# Patient Record
Sex: MALE | Race: WHITE | NOT HISPANIC OR LATINO
[De-identification: names, ages, dates, MRNs, and addresses within clinical notes are randomized per-mention and may not be internally consistent; named-entity substitution may affect disease eponyms.]

---

## 2018-11-14 PROBLEM — Z00.00 ENCOUNTER FOR PREVENTIVE HEALTH EXAMINATION: Status: ACTIVE | Noted: 2018-11-14

## 2019-03-11 ENCOUNTER — RECORD ABSTRACTING (OUTPATIENT)
Age: 59
End: 2019-03-11

## 2019-03-11 DIAGNOSIS — Z80.1 FAMILY HISTORY OF MALIGNANT NEOPLASM OF TRACHEA, BRONCHUS AND LUNG: ICD-10-CM

## 2019-03-11 DIAGNOSIS — Z82.49 FAMILY HISTORY OF ISCHEMIC HEART DISEASE AND OTHER DISEASES OF THE CIRCULATORY SYSTEM: ICD-10-CM

## 2019-03-11 DIAGNOSIS — Z83.3 FAMILY HISTORY OF DIABETES MELLITUS: ICD-10-CM

## 2019-03-11 DIAGNOSIS — Z78.9 OTHER SPECIFIED HEALTH STATUS: ICD-10-CM

## 2019-05-02 ENCOUNTER — APPOINTMENT (OUTPATIENT)
Dept: CARDIOLOGY | Facility: CLINIC | Age: 59
End: 2019-05-02
Payer: COMMERCIAL

## 2019-05-02 VITALS
HEART RATE: 96 BPM | DIASTOLIC BLOOD PRESSURE: 78 MMHG | WEIGHT: 217 LBS | SYSTOLIC BLOOD PRESSURE: 130 MMHG | HEIGHT: 68 IN | BODY MASS INDEX: 32.89 KG/M2

## 2019-05-02 PROCEDURE — 99212 OFFICE O/P EST SF 10 MIN: CPT | Mod: 25

## 2019-05-02 PROCEDURE — 93015 CV STRESS TEST SUPVJ I&R: CPT

## 2019-05-02 RX ORDER — METOPROLOL TARTRATE 25 MG/1
25 TABLET, FILM COATED ORAL
Refills: 0 | Status: DISCONTINUED | COMMUNITY
End: 2019-05-02

## 2019-05-02 NOTE — DISCUSSION/SUMMARY
[FreeTextEntry1] : Today's treadmill stress test reveals no evidence of exercise-induced myocardial ischemia. I am encouraging the patient to take up again a program of regular aerobic exercise and weight reduction. I have reviewed his medications which appear to be appropriate in these will be continued. His labs including lipid levels have been checked by his primary care physician. Based on today's examination I find no gross cardiac status to be stable. We will continue to follow him on an annual basis.

## 2019-05-02 NOTE — PHYSICAL EXAM
[Well Groomed] : well groomed [Normal Appearance] : normal appearance [General Appearance - Well Developed] : well developed [General Appearance - Well Nourished] : well nourished [No Deformities] : no deformities [Normal Conjunctiva] : the conjunctiva exhibited no abnormalities [General Appearance - In No Acute Distress] : no acute distress [Eyelids - No Xanthelasma] : the eyelids demonstrated no xanthelasmas [No Oral Pallor] : no oral pallor [Normal Oral Mucosa] : normal oral mucosa [Normal Jugular Venous A Waves Present] : normal jugular venous A waves present [No Oral Cyanosis] : no oral cyanosis [Normal Jugular Venous V Waves Present] : normal jugular venous V waves present [No Jugular Venous Gonzales A Waves] : no jugular venous gonzales A waves [Exaggerated Use Of Accessory Muscles For Inspiration] : no accessory muscle use [Respiration, Rhythm And Depth] : normal respiratory rhythm and effort [Auscultation Breath Sounds / Voice Sounds] : lungs were clear to auscultation bilaterally [Heart Rate And Rhythm] : heart rate and rhythm were normal [Murmurs] : no murmurs present [Heart Sounds] : normal S1 and S2 [Abdomen Soft] : soft [Abdomen Tenderness] : non-tender [Abdomen Mass (___ Cm)] : no abdominal mass palpated [Abnormal Walk] : normal gait [Nail Clubbing] : no clubbing of the fingernails [Gait - Sufficient For Exercise Testing] : the gait was sufficient for exercise testing [Cyanosis, Localized] : no localized cyanosis [Petechial Hemorrhages (___cm)] : no petechial hemorrhages [Skin Color & Pigmentation] : normal skin color and pigmentation [] : no rash [Skin Lesions] : no skin lesions [No Venous Stasis] : no venous stasis [No Skin Ulcers] : no skin ulcer [No Xanthoma] : no  xanthoma was observed

## 2019-05-02 NOTE — REASON FOR VISIT
[FreeTextEntry1] : Patient comes for followup today and stress testing. He is followed in a preventive mode with coronary risk factors of hypertension and hyperlipidemia these are controlled with medications. The patient has had no recent cardiac symptoms. Exercise has been limited during these past several months as he has been caring for his wife who was injured in a full

## 2019-06-20 ENCOUNTER — APPOINTMENT (OUTPATIENT)
Dept: CARDIOLOGY | Facility: CLINIC | Age: 59
End: 2019-06-20
Payer: COMMERCIAL

## 2019-06-20 VITALS
HEIGHT: 68 IN | WEIGHT: 213 LBS | DIASTOLIC BLOOD PRESSURE: 80 MMHG | BODY MASS INDEX: 32.28 KG/M2 | SYSTOLIC BLOOD PRESSURE: 130 MMHG | HEART RATE: 77 BPM

## 2019-06-20 PROCEDURE — 93000 ELECTROCARDIOGRAM COMPLETE: CPT

## 2019-06-20 PROCEDURE — 99214 OFFICE O/P EST MOD 30 MIN: CPT

## 2019-06-20 RX ORDER — PENICILLIN V POTASSIUM 500 MG/1
500 TABLET, FILM COATED ORAL
Qty: 28 | Refills: 0 | Status: DISCONTINUED | COMMUNITY
Start: 2019-02-13 | End: 2019-06-20

## 2019-06-20 RX ORDER — OXYCODONE AND ACETAMINOPHEN 5; 325 MG/1; MG/1
5-325 TABLET ORAL
Qty: 10 | Refills: 0 | Status: DISCONTINUED | COMMUNITY
Start: 2019-02-28 | End: 2019-06-20

## 2019-06-20 NOTE — REASON FOR VISIT
[FreeTextEntry1] : Patient had a peculiar incident yesterday while at work. He developed visual blurring followed by a severe headache in the frontal region bilaterally. Patient appeared to be flushed and an ambulance was called which took him to [facility] ER where he underwent an evaluation including a CT scan of the brain. This was negative. The patient's blood pressure was elevated on arrival in the ER. Patient was sent home and advised to followup with his physicians. Since that time and has been no recurrence of symptoms he is doing much better today but remains concerned about what happened yesterday.

## 2019-06-20 NOTE — DISCUSSION/SUMMARY
[FreeTextEntry1] : The patient's symptoms from yesterday. I don't find any compelling evidence that this was indeed a TIA and there were no focal or lateralizing neurologic signs. Because of the severe frontal headache that was present throughout the illness I believe migraine headache as a possibility. I don't believe this was of a cardiac nature. There was no evidence to suggest a cardiac arrhythmia. Today's blood pressure is 130/80 the electrocardiogram was normal. I believe the patient's clinical condition at this time is stable. The correct referral would be to neurology. Possibly an additional examination including MRI might be indicated. I'm referring the patient to a neurologist for further evaluation.

## 2019-06-20 NOTE — HISTORY OF PRESENT ILLNESS
[FreeTextEntry1] : During the present illness there was no nausea or vomiting fever or chills.Some reason a spinal tap was recommended which the patient declined. Patient's wife saw him in the ER she is a registered nurse and found him to be disoriented. The patient was improved on discharge but not completely better. He continues to neck problems today and is virtually back to normal. Her headache was present sustained during the day and throughout the night and finally resolving with an Excedrin earlier this morning. It should be noted that the patient did have severe migraine headaches many years ago and was once hospitalized for this and even underwent an angiogram. He was at that time the diagnosis of migraines was made. He has never been on medical therapy for migraine headaches. He has not been under any unusual stress at home or at work.

## 2019-06-20 NOTE — PHYSICAL EXAM
[Normal Appearance] : normal appearance [General Appearance - Well Developed] : well developed [Well Groomed] : well groomed [General Appearance - Well Nourished] : well nourished [No Deformities] : no deformities [Eyelids - No Xanthelasma] : the eyelids demonstrated no xanthelasmas [General Appearance - In No Acute Distress] : no acute distress [Normal Conjunctiva] : the conjunctiva exhibited no abnormalities [Normal Oral Mucosa] : normal oral mucosa [No Oral Pallor] : no oral pallor [Normal Jugular Venous A Waves Present] : normal jugular venous A waves present [No Oral Cyanosis] : no oral cyanosis [Normal Jugular Venous V Waves Present] : normal jugular venous V waves present [No Jugular Venous Gonzales A Waves] : no jugular venous gonzales A waves [Respiration, Rhythm And Depth] : normal respiratory rhythm and effort [Auscultation Breath Sounds / Voice Sounds] : lungs were clear to auscultation bilaterally [Exaggerated Use Of Accessory Muscles For Inspiration] : no accessory muscle use [Heart Sounds] : normal S1 and S2 [Heart Rate And Rhythm] : heart rate and rhythm were normal [Murmurs] : no murmurs present [Abdomen Soft] : soft [Abdomen Tenderness] : non-tender [Abdomen Mass (___ Cm)] : no abdominal mass palpated [Abnormal Walk] : normal gait [Gait - Sufficient For Exercise Testing] : the gait was sufficient for exercise testing [Cyanosis, Localized] : no localized cyanosis [Nail Clubbing] : no clubbing of the fingernails [Petechial Hemorrhages (___cm)] : no petechial hemorrhages [Skin Color & Pigmentation] : normal skin color and pigmentation [] : no ischemic changes [Skin Lesions] : no skin lesions [No Venous Stasis] : no venous stasis [No Xanthoma] : no  xanthoma was observed [Oriented To Time, Place, And Person] : oriented to person, place, and time [No Skin Ulcers] : no skin ulcer [Affect] : the affect was normal [Mood] : the mood was normal [No Anxiety] : not feeling anxious

## 2019-07-19 ENCOUNTER — APPOINTMENT (OUTPATIENT)
Dept: CARDIOLOGY | Facility: CLINIC | Age: 59
End: 2019-07-19

## 2019-07-25 ENCOUNTER — APPOINTMENT (OUTPATIENT)
Dept: CARDIOLOGY | Facility: CLINIC | Age: 59
End: 2019-07-25
Payer: COMMERCIAL

## 2019-07-25 VITALS
DIASTOLIC BLOOD PRESSURE: 76 MMHG | HEIGHT: 68 IN | WEIGHT: 210 LBS | BODY MASS INDEX: 31.83 KG/M2 | SYSTOLIC BLOOD PRESSURE: 124 MMHG | HEART RATE: 83 BPM

## 2019-07-25 PROCEDURE — 0296T: CPT

## 2019-07-25 PROCEDURE — 93306 TTE W/DOPPLER COMPLETE: CPT

## 2019-07-25 RX ORDER — CHLORHEXIDINE GLUCONATE, 0.12% ORAL RINSE 1.2 MG/ML
0.12 SOLUTION DENTAL
Qty: 473 | Refills: 0 | Status: DISCONTINUED | COMMUNITY
Start: 2019-02-13 | End: 2019-07-25

## 2019-07-25 NOTE — REASON FOR VISIT
[FreeTextEntry1] : The patient is undergoing an evaluation for a recent neurologic event. Please refer to my earlier report for a full description. The patient's clinical condition has completely normalized. There have been no recurrent symptoms he is back at work and is exercising regularly and has lost 7 pounds. He comes to the office today for echocardiography and placement of a event monitor.

## 2019-08-15 PROCEDURE — 0298T: CPT

## 2020-05-04 ENCOUNTER — NON-APPOINTMENT (OUTPATIENT)
Age: 60
End: 2020-05-04

## 2020-05-04 ENCOUNTER — APPOINTMENT (OUTPATIENT)
Dept: CARDIOLOGY | Facility: CLINIC | Age: 60
End: 2020-05-04
Payer: COMMERCIAL

## 2020-05-04 PROCEDURE — 99214 OFFICE O/P EST MOD 30 MIN: CPT

## 2020-05-04 PROCEDURE — 93000 ELECTROCARDIOGRAM COMPLETE: CPT

## 2020-05-04 NOTE — DISCUSSION/SUMMARY
[FreeTextEntry1] : tHE PATIENT HAS HAD NO significant cardiac symptoms. I am concerned about weight gain. I have encouraged him to resume his program of regular walking exercise and a low-carb  diet. Weight reduction would be very desirable in this case. Also this may have salutary effects with regard to sleep apnea. The patient had a regular physical examination earlier today with his primary-care blood testing was drawn and I am requesting a copy for my review. Also I'd like to do treadmill stress testing at the next visit within several months.

## 2020-05-04 NOTE — PHYSICAL EXAM
[Normal Conjunctiva] : the conjunctiva exhibited no abnormalities [Normal Oral Mucosa] : normal oral mucosa [Eyelids - No Xanthelasma] : the eyelids demonstrated no xanthelasmas [No Oral Cyanosis] : no oral cyanosis [No Oral Pallor] : no oral pallor [Normal Jugular Venous A Waves Present] : normal jugular venous A waves present [Normal Jugular Venous V Waves Present] : normal jugular venous V waves present [No Jugular Venous Gonzales A Waves] : no jugular venous gonzales A waves [Respiration, Rhythm And Depth] : normal respiratory rhythm and effort [Exaggerated Use Of Accessory Muscles For Inspiration] : no accessory muscle use [Auscultation Breath Sounds / Voice Sounds] : lungs were clear to auscultation bilaterally [Heart Sounds] : normal S1 and S2 [Heart Rate And Rhythm] : heart rate and rhythm were normal [Murmurs] : no murmurs present [Abdomen Soft] : soft [Abdomen Tenderness] : non-tender [Abdomen Mass (___ Cm)] : no abdominal mass palpated [Abnormal Walk] : normal gait [Gait - Sufficient For Exercise Testing] : the gait was sufficient for exercise testing [Nail Clubbing] : no clubbing of the fingernails [Cyanosis, Localized] : no localized cyanosis [Petechial Hemorrhages (___cm)] : no petechial hemorrhages [Skin Color & Pigmentation] : normal skin color and pigmentation [] : no rash [No Venous Stasis] : no venous stasis [Skin Lesions] : no skin lesions [No Skin Ulcers] : no skin ulcer [No Xanthoma] : no  xanthoma was observed [Oriented To Time, Place, And Person] : oriented to person, place, and time [Affect] : the affect was normal [Mood] : the mood was normal [No Anxiety] : not feeling anxious [FreeTextEntry1] : OVERWEIGHT/CENTRAL OBESITY.

## 2020-05-04 NOTE — REASON FOR VISIT
[FreeTextEntry1] : The patient in general has done well. However after shoulder surgery several months ago his activities were limited and there has been weight gain. There have been no acute symptoms of unusual shortness of breath palpitations dizziness or lightheadedness. He does note an occasional right pectoral discomfort which is not related to physical exertion.

## 2020-07-31 ENCOUNTER — APPOINTMENT (OUTPATIENT)
Dept: CARDIOLOGY | Facility: CLINIC | Age: 60
End: 2020-07-31

## 2020-09-01 ENCOUNTER — APPOINTMENT (OUTPATIENT)
Dept: CARDIOLOGY | Facility: CLINIC | Age: 60
End: 2020-09-01
Payer: COMMERCIAL

## 2020-09-01 VITALS
WEIGHT: 213 LBS | HEIGHT: 68 IN | SYSTOLIC BLOOD PRESSURE: 110 MMHG | DIASTOLIC BLOOD PRESSURE: 72 MMHG | HEART RATE: 54 BPM | BODY MASS INDEX: 32.28 KG/M2

## 2020-09-01 DIAGNOSIS — R07.9 CHEST PAIN, UNSPECIFIED: ICD-10-CM

## 2020-09-01 PROCEDURE — 93015 CV STRESS TEST SUPVJ I&R: CPT

## 2020-09-01 PROCEDURE — 99213 OFFICE O/P EST LOW 20 MIN: CPT | Mod: 25

## 2020-09-01 NOTE — REASON FOR VISIT
[FreeTextEntry1] : The patient returns today for followup including stress testing. He is followed at night mainly preventive mode with hypertension and hyperlipidemia.

## 2020-09-01 NOTE — PHYSICAL EXAM
[General Appearance - Well Developed] : well developed [Normal Appearance] : normal appearance [Well Groomed] : well groomed [General Appearance - Well Nourished] : well nourished [No Deformities] : no deformities [General Appearance - In No Acute Distress] : no acute distress [FreeTextEntry1] : overweight [Normal Conjunctiva] : the conjunctiva exhibited no abnormalities [Eyelids - No Xanthelasma] : the eyelids demonstrated no xanthelasmas [Normal Oral Mucosa] : normal oral mucosa [No Oral Pallor] : no oral pallor [No Oral Cyanosis] : no oral cyanosis [Normal Jugular Venous A Waves Present] : normal jugular venous A waves present [Normal Jugular Venous V Waves Present] : normal jugular venous V waves present [No Jugular Venous Gonzales A Waves] : no jugular venous gonzales A waves [Respiration, Rhythm And Depth] : normal respiratory rhythm and effort [Exaggerated Use Of Accessory Muscles For Inspiration] : no accessory muscle use [Auscultation Breath Sounds / Voice Sounds] : lungs were clear to auscultation bilaterally [Heart Rate And Rhythm] : heart rate and rhythm were normal [Heart Sounds] : normal S1 and S2 [Murmurs] : no murmurs present [Abdomen Soft] : soft [Abdomen Tenderness] : non-tender [Abdomen Mass (___ Cm)] : no abdominal mass palpated [Abnormal Walk] : normal gait [Gait - Sufficient For Exercise Testing] : the gait was sufficient for exercise testing [Nail Clubbing] : no clubbing of the fingernails [Cyanosis, Localized] : no localized cyanosis [Petechial Hemorrhages (___cm)] : no petechial hemorrhages [Skin Color & Pigmentation] : normal skin color and pigmentation [] : no rash [No Venous Stasis] : no venous stasis [Skin Lesions] : no skin lesions [No Skin Ulcers] : no skin ulcer [No Xanthoma] : no  xanthoma was observed [Oriented To Time, Place, And Person] : oriented to person, place, and time [Affect] : the affect was normal [Mood] : the mood was normal [No Anxiety] : not feeling anxious

## 2020-09-01 NOTE — DISCUSSION/SUMMARY
[FreeTextEntry1] : The patient returns today for followup and stress testing. There have been no cardiac or neurologic symptoms. Last year he underwent a very thorough neurologic evaluation for symptoms of blurred vision numbness et cetera. The final diagnosis was a migraine variant. Also the patient has probable sleep apnea but could not adjust to a CPAP machine. I suggested to the patient that he might try a different lead configured CPAP mask. Today's stress test reveals no evidence of exercise-induced myocardial ischemia or arrhythmias. I find his cardiac status to be stable. The current medications will be continued. There has been some weight gain during the current  epidemic. Encouraging the patient to try to maintain a program of exercise and weight reduction.

## 2020-09-02 RX ORDER — HYDROCORTISONE 25 MG/G
2.5 CREAM TOPICAL
Qty: 28 | Refills: 0 | Status: DISCONTINUED | COMMUNITY
Start: 2020-07-22 | End: 2020-09-02

## 2020-09-02 RX ORDER — ESCITALOPRAM OXALATE 10 MG/1
10 TABLET, FILM COATED ORAL
Refills: 0 | Status: DISCONTINUED | COMMUNITY
End: 2020-09-02

## 2020-09-02 RX ORDER — MULTIVIT-MIN/FOLIC/VIT K/LYCOP 400-300MCG
25 MCG TABLET ORAL
Refills: 0 | Status: DISCONTINUED | COMMUNITY
End: 2020-09-02

## 2020-09-02 RX ORDER — KETOCONAZOLE 20 MG/G
2 CREAM TOPICAL
Qty: 60 | Refills: 0 | Status: DISCONTINUED | COMMUNITY
Start: 2020-07-22 | End: 2020-09-02

## 2021-05-06 ENCOUNTER — NON-APPOINTMENT (OUTPATIENT)
Age: 61
End: 2021-05-06

## 2021-05-06 ENCOUNTER — APPOINTMENT (OUTPATIENT)
Dept: CARDIOLOGY | Facility: CLINIC | Age: 61
End: 2021-05-06
Payer: COMMERCIAL

## 2021-05-06 PROCEDURE — 93000 ELECTROCARDIOGRAM COMPLETE: CPT

## 2021-05-06 PROCEDURE — 99072 ADDL SUPL MATRL&STAF TM PHE: CPT

## 2021-05-06 PROCEDURE — 99213 OFFICE O/P EST LOW 20 MIN: CPT

## 2021-05-06 RX ORDER — DULOXETINE HYDROCHLORIDE 20 MG/1
20 CAPSULE, DELAYED RELEASE PELLETS ORAL TWICE DAILY
Refills: 0 | Status: ACTIVE | COMMUNITY

## 2021-05-06 RX ORDER — METOPROLOL SUCCINATE 25 MG/1
25 TABLET, EXTENDED RELEASE ORAL
Qty: 90 | Refills: 3 | Status: ACTIVE | COMMUNITY

## 2021-05-06 RX ORDER — MONTELUKAST 10 MG/1
10 TABLET, FILM COATED ORAL DAILY
Refills: 0 | Status: ACTIVE | COMMUNITY

## 2021-05-06 RX ORDER — OMEGA-3/DHA/EPA/FISH OIL 300-1000MG
1000 CAPSULE ORAL
Refills: 0 | Status: ACTIVE | COMMUNITY

## 2021-05-06 RX ORDER — ROSUVASTATIN CALCIUM 10 MG/1
10 TABLET, FILM COATED ORAL
Qty: 90 | Refills: 3 | Status: ACTIVE | COMMUNITY

## 2021-05-06 RX ORDER — ASPIRIN 81 MG/1
81 TABLET, COATED ORAL DAILY
Refills: 0 | Status: ACTIVE | COMMUNITY

## 2021-05-06 NOTE — DISCUSSION/SUMMARY
[FreeTextEntry1] : I'm very happy with today's visit. The patient has made great clinical progress. His blood pressure is now under very good control and he has lost a considerable amount of weight. I plan to continue the current medications along with a continuing program of diet and exercise. The patient had a repeat lab test today and I am requesting the results.

## 2021-05-06 NOTE — REASON FOR VISIT
[FreeTextEntry1] : The patient returns for followup today he is followed mainly in her preventive mode with the principal diagnosis of hypertension and hyperlipidemia. The patient has made great progress since his last visit he has lost about 10 pounds by following a better diet restricted in carbohydrates. There have no acute cardiac symptoms.

## 2022-08-11 ENCOUNTER — NON-APPOINTMENT (OUTPATIENT)
Age: 62
End: 2022-08-11

## 2022-08-11 ENCOUNTER — APPOINTMENT (OUTPATIENT)
Dept: CARDIOLOGY | Facility: CLINIC | Age: 62
End: 2022-08-11

## 2022-08-11 VITALS
BODY MASS INDEX: 30.92 KG/M2 | SYSTOLIC BLOOD PRESSURE: 120 MMHG | OXYGEN SATURATION: 96 % | WEIGHT: 204 LBS | HEIGHT: 68 IN | DIASTOLIC BLOOD PRESSURE: 76 MMHG | HEART RATE: 70 BPM

## 2022-08-11 DIAGNOSIS — I10 ESSENTIAL (PRIMARY) HYPERTENSION: ICD-10-CM

## 2022-08-11 PROCEDURE — 93015 CV STRESS TEST SUPVJ I&R: CPT

## 2022-08-11 PROCEDURE — 99213 OFFICE O/P EST LOW 20 MIN: CPT | Mod: 25

## 2022-08-11 RX ORDER — BENZONATATE 200 MG/1
200 CAPSULE ORAL
Qty: 30 | Refills: 0 | Status: ACTIVE | COMMUNITY
Start: 2022-06-15

## 2022-08-11 RX ORDER — HYDROCODONE BITARTRATE AND HOMATROPINE METHYLBROMIDE 5; 1.5 MG/5ML; MG/5ML
5-1.5 SOLUTION ORAL
Qty: 200 | Refills: 0 | Status: ACTIVE | COMMUNITY
Start: 2022-06-24

## 2022-08-11 RX ORDER — MOLNUPIRAVIR 200 MG/1
200 CAPSULE ORAL
Qty: 40 | Refills: 0 | Status: ACTIVE | COMMUNITY
Start: 2022-06-15

## 2022-08-11 RX ORDER — SODIUM FLUORIDE 6 MG/ML
1.1 PASTE DENTAL
Qty: 100 | Refills: 0 | Status: ACTIVE | COMMUNITY
Start: 2022-01-27

## 2022-08-11 RX ORDER — PRIMIDONE 50 MG/1
50 TABLET ORAL
Qty: 180 | Refills: 0 | Status: ACTIVE | COMMUNITY
Start: 2022-07-11

## 2022-08-11 RX ORDER — UBROGEPANT 100 MG/1
100 TABLET ORAL
Refills: 0 | Status: DISCONTINUED | COMMUNITY
End: 2022-08-11

## 2022-08-11 NOTE — REVIEW OF SYSTEMS
[Negative] : Heme/Lymph [de-identified] : The patient has been treated lately for benign essential tremor

## 2022-08-11 NOTE — DISCUSSION/SUMMARY
[FreeTextEntry1] : The patient's clinical condition has improved.  He has lost weight with diet and exercise\par The cardiorespiratory examination is normal\par \par The patient did very well on today's stress test there was no evidence of exercise-induced myocardial ischemia or arrhythmias and the patient has good exercise capacity.\par \par Based on my evaluation and assessment I am very pleased with the patient's progress.  The current medications will be continued we will follow-up in 1 year

## 2022-08-11 NOTE — REASON FOR VISIT
[FreeTextEntry1] : Patient returns today for follow-up including treadmill testing.  We are following him in a preventive mode with a history of hypertension and hyperlipidemia.\par Patient has had no acute cardiac symptoms there have been no symptoms of chest pain shortness of breath or palpitations.  He has lost a great deal of weight by following a healthier diet combined with regular exercise.\par Approximately 2 years ago there was a scare regarding a visual disturbance however after a thorough evaluation this was felt to be due to an ocular migraine.

## 2022-09-07 NOTE — DISCUSSION/SUMMARY
[FreeTextEntry1] : Today's echocardiogram just completed reveals normal LV and RV function. Estimated left ventricular ejection fraction 65%. There were no significant valvular abnormalities noted. The left atrial volume is slightly increased. There is no substrate for a cardio embolic event demonstrated on this study. In order to complete the cardiology evaluation I am placing a two-week cardiac monitor in the hopes of discovering occult arrhythmia. 10

## 2023-05-24 ENCOUNTER — OFFICE (OUTPATIENT)
Dept: URBAN - METROPOLITAN AREA CLINIC 29 | Facility: CLINIC | Age: 63
Setting detail: OPHTHALMOLOGY
End: 2023-05-24
Payer: COMMERCIAL

## 2023-05-24 DIAGNOSIS — H01.002: ICD-10-CM

## 2023-05-24 DIAGNOSIS — S04.52XA: ICD-10-CM

## 2023-05-24 DIAGNOSIS — H40.033: ICD-10-CM

## 2023-05-24 DIAGNOSIS — H01.004: ICD-10-CM

## 2023-05-24 DIAGNOSIS — H01.001: ICD-10-CM

## 2023-05-24 DIAGNOSIS — H01.005: ICD-10-CM

## 2023-05-24 DIAGNOSIS — H25.13: ICD-10-CM

## 2023-05-24 PROBLEM — H10.50 BLEPHAROCONJUNCTIVITIS UNSPECIFIED [ICD-10: H10.50-, ICD-9: 372.20]>>: Status: ACTIVE | Noted: 2023-05-24

## 2023-05-24 PROCEDURE — 99213 OFFICE O/P EST LOW 20 MIN: CPT | Performed by: OPHTHALMOLOGY

## 2023-05-24 ASSESSMENT — SPHEQUIV_DERIVED
OD_SPHEQUIV: 2.5
OD_SPHEQUIV: 3
OS_SPHEQUIV: 2.625
OS_SPHEQUIV: 2.25
OD_SPHEQUIV: 2.5
OS_SPHEQUIV: 2.625

## 2023-05-24 ASSESSMENT — REFRACTION_MANIFEST
OD_SPHERE: +2.00
OD_ADD: +2.50
OD_ADD: +2.00
OS_VA1: 20/25
OS_SPHERE: +2.00
OD_SPHERE: +2.75
OS_SPHERE: +2.25
OD_VA1: 20/40
OD_CYLINDER: +0.50
OD_CYLINDER: +1.00
OS_AXIS: 015
OS_AXIS: 15
OS_CYLINDER: +0.50
OS_VA1: 20/25
OS_ADD: +2.00
OS_CYLINDER: +0.75
OS_ADD: +2.50
OD_AXIS: 165
OD_AXIS: 160
OD_VA1: 20/25

## 2023-05-24 ASSESSMENT — REFRACTION_CURRENTRX
OS_AXIS: 15
OS_ADD: +2.00
OS_CYLINDER: +0.75
OD_OVR_VA: 20/
OD_AXIS: 160
OS_OVR_VA: 20/
OD_ADD: +2.00
OS_SPHERE: +2.00
OD_SPHERE: +2.75
OD_CYLINDER: +0.50

## 2023-05-24 ASSESSMENT — REFRACTION_AUTOREFRACTION
OS_SPHERE: +2.25
OD_CYLINDER: +1.00
OD_SPHERE: +2.00
OS_AXIS: 025
OD_AXIS: 165
OS_CYLINDER: +0.75

## 2023-05-24 ASSESSMENT — VISUAL ACUITY
OD_BCVA: 20/25
OS_BCVA: 20/40+1

## 2023-05-24 ASSESSMENT — LID EXAM ASSESSMENTS
OD_BLEPHARITIS: RLL RUL 1+
OS_BLEPHARITIS: LLL LUL 1+

## 2023-05-24 ASSESSMENT — CONFRONTATIONAL VISUAL FIELD TEST (CVF)
OS_FINDINGS: FULL
OD_FINDINGS: FULL

## 2023-08-15 ENCOUNTER — APPOINTMENT (OUTPATIENT)
Dept: CARDIOLOGY | Facility: CLINIC | Age: 63
End: 2023-08-15
Payer: COMMERCIAL

## 2023-08-15 ENCOUNTER — NON-APPOINTMENT (OUTPATIENT)
Age: 63
End: 2023-08-15

## 2023-08-15 VITALS
SYSTOLIC BLOOD PRESSURE: 118 MMHG | OXYGEN SATURATION: 97 % | HEART RATE: 93 BPM | WEIGHT: 195 LBS | HEIGHT: 68 IN | BODY MASS INDEX: 29.55 KG/M2 | DIASTOLIC BLOOD PRESSURE: 74 MMHG

## 2023-08-15 DIAGNOSIS — Z86.73 PERSONAL HISTORY OF TRANSIENT ISCHEMIC ATTACK (TIA), AND CEREBRAL INFARCTION W/OUT RESIDUAL DEFICITS: ICD-10-CM

## 2023-08-15 DIAGNOSIS — I10 ESSENTIAL (PRIMARY) HYPERTENSION: ICD-10-CM

## 2023-08-15 DIAGNOSIS — G43.109 MIGRAINE WITH AURA, NOT INTRACTABLE, W/OUT STATUS MIGRAINOSUS: ICD-10-CM

## 2023-08-15 DIAGNOSIS — E78.5 HYPERLIPIDEMIA, UNSPECIFIED: ICD-10-CM

## 2023-08-15 PROCEDURE — 99213 OFFICE O/P EST LOW 20 MIN: CPT | Mod: 25

## 2023-08-15 PROCEDURE — 93000 ELECTROCARDIOGRAM COMPLETE: CPT

## 2023-08-15 NOTE — DISCUSSION/SUMMARY
[FreeTextEntry1] : The patient returns for follow-up today  When I initially evaluated him a number of years ago he complained of chest discomfort this proved to be noncardiac in nature and has completely resolved  The patient has made excellent clinical progress especially with regard to weight reduction by following a better diet limited in carbohydrates  Blood pressure is normal cardiorespiratory examination is normal electrocardiogram is normal  Stress testing last year normal  Based on my evaluation and assessment I find the patient's cardiovascular status to be stable the current medical conditions will be continued we will do routine treadmill testing in 1 yeaR.

## 2023-08-15 NOTE — REASON FOR VISIT
[FreeTextEntry1] : The patient returns for follow-up today.  We have been following him mainly in a preventive mode with a primary diagnoses of hypertension and hyperlipidemia.  These have been very well controlled with medications.  There was a possible diagnosis of TIA several years ago which caused a transient visual abnormality.  However this was thoroughly evaluated and shown to be an ocular migraine.  There have been no recurrences of migraines or visual disturbances  The patient has been very successful in losing weight by following a low carbohydrate diet  He describes no recent cardiac symptoms there have been no symptoms of chest pain shortness of breath or palpitations.  The patient describes a mild form of Bell's palsy involving the left side of the face several months ago this has virtually resolved.

## 2023-09-29 ENCOUNTER — RX ONLY (RX ONLY)
Age: 63
End: 2023-09-29

## 2023-09-29 ENCOUNTER — OFFICE (OUTPATIENT)
Dept: URBAN - METROPOLITAN AREA CLINIC 29 | Facility: CLINIC | Age: 63
Setting detail: OPHTHALMOLOGY
End: 2023-09-29
Payer: COMMERCIAL

## 2023-09-29 DIAGNOSIS — H01.001: ICD-10-CM

## 2023-09-29 DIAGNOSIS — S04.52XA: ICD-10-CM

## 2023-09-29 DIAGNOSIS — H01.005: ICD-10-CM

## 2023-09-29 DIAGNOSIS — H35.372: ICD-10-CM

## 2023-09-29 DIAGNOSIS — H01.004: ICD-10-CM

## 2023-09-29 DIAGNOSIS — H25.13: ICD-10-CM

## 2023-09-29 DIAGNOSIS — H43.811: ICD-10-CM

## 2023-09-29 DIAGNOSIS — H01.002: ICD-10-CM

## 2023-09-29 DIAGNOSIS — H40.033: ICD-10-CM

## 2023-09-29 PROCEDURE — 92020 GONIOSCOPY: CPT | Performed by: OPHTHALMOLOGY

## 2023-09-29 PROCEDURE — 99214 OFFICE O/P EST MOD 30 MIN: CPT | Performed by: OPHTHALMOLOGY

## 2023-09-29 ASSESSMENT — REFRACTION_CURRENTRX
OD_AXIS: 167
OS_OVR_VA: 20/
OS_ADD: +2.00
OD_ADD: +2.00
OS_CYLINDER: +1.00
OS_VPRISM_DIRECTION: PROGS
OD_CYLINDER: +1.00
OS_SPHERE: +2.00
OS_AXIS: 12
OD_SPHERE: +3.00
OD_VPRISM_DIRECTION: PROGS
OD_OVR_VA: 20/

## 2023-09-29 ASSESSMENT — REFRACTION_MANIFEST
OD_CYLINDER: +1.00
OD_ADD: +2.50
OS_AXIS: 15
OS_ADD: +2.50
OS_ADD: +2.50
OS_VA1: 20/25
OU_VA: 20/30-
OS_CYLINDER: +0.50
OD_VA1: 20/40-2
OD_AXIS: 160
OD_ADD: +2.50
OS_SPHERE: +1.25
OS_CYLINDER: +0.50
OD_SPHERE: +2.00
OS_VA1: 20/25-2
OS_SPHERE: +2.00
OD_AXIS: 160
OD_CYLINDER: +1.25
OD_VA1: 20/40
OD_SPHERE: +2.00
OS_AXIS: 015

## 2023-09-29 ASSESSMENT — AXIALLENGTH_DERIVED
OS_AL: 29.36
OD_AL: 22.6117
OS_AL: 29.51
OD_AL: 22.7921
OS_AL: 29.82
OD_AL: 22.6565

## 2023-09-29 ASSESSMENT — KERATOMETRY
OD_K1POWER_DIOPTERS: 43.25
OD_AXISANGLE_DEGREES: 180
OS_K2POWER_DIOPTERS: 13.50
OD_K2POWER_DIOPTERS: 43.75
OS_AXISANGLE_DEGREES: 090
OS_K1POWER_DIOPTERS: 43.50

## 2023-09-29 ASSESSMENT — SPHEQUIV_DERIVED
OD_SPHEQUIV: 2.5
OD_SPHEQUIV: 2.625
OS_SPHEQUIV: 2.25
OS_SPHEQUIV: 1.5
OD_SPHEQUIV: 2.125
OS_SPHEQUIV: 2

## 2023-09-29 ASSESSMENT — TONOMETRY
OS_IOP_MMHG: 14
OD_IOP_MMHG: 14

## 2023-09-29 ASSESSMENT — CONFRONTATIONAL VISUAL FIELD TEST (CVF)
OD_FINDINGS: FULL
OS_FINDINGS: FULL

## 2023-09-29 ASSESSMENT — LID EXAM ASSESSMENTS
OS_BLEPHARITIS: LLL LUL 1+
OD_BLEPHARITIS: RLL RUL 1+

## 2023-09-29 ASSESSMENT — VISUAL ACUITY
OD_BCVA: 20/25-2
OS_BCVA: 20/40-1

## 2023-09-29 ASSESSMENT — REFRACTION_AUTOREFRACTION
OD_SPHERE: +1.50
OD_CYLINDER: +1.25
OS_SPHERE: +1.75
OD_AXIS: 168
OS_AXIS: 047
OS_CYLINDER: +0.50

## 2023-12-20 ENCOUNTER — OFFICE (OUTPATIENT)
Dept: URBAN - METROPOLITAN AREA CLINIC 30 | Facility: CLINIC | Age: 63
Setting detail: OPHTHALMOLOGY
End: 2023-12-20
Payer: COMMERCIAL

## 2023-12-20 DIAGNOSIS — H01.001: ICD-10-CM

## 2023-12-20 DIAGNOSIS — H35.372: ICD-10-CM

## 2023-12-20 DIAGNOSIS — H25.12: ICD-10-CM

## 2023-12-20 DIAGNOSIS — H01.002: ICD-10-CM

## 2023-12-20 DIAGNOSIS — H01.004: ICD-10-CM

## 2023-12-20 DIAGNOSIS — H52.213: ICD-10-CM

## 2023-12-20 DIAGNOSIS — H25.13: ICD-10-CM

## 2023-12-20 DIAGNOSIS — H25.11: ICD-10-CM

## 2023-12-20 PROCEDURE — 92136 OPHTHALMIC BIOMETRY: CPT | Mod: RT | Performed by: OPHTHALMOLOGY

## 2023-12-20 PROCEDURE — 99213 OFFICE O/P EST LOW 20 MIN: CPT | Performed by: OPHTHALMOLOGY

## 2023-12-20 PROCEDURE — 92136 OPHTHALMIC BIOMETRY: CPT | Mod: TC | Performed by: OPHTHALMOLOGY

## 2023-12-20 PROCEDURE — 92134 CPTRZ OPH DX IMG PST SGM RTA: CPT | Performed by: OPHTHALMOLOGY

## 2023-12-20 PROCEDURE — 92025 CPTRIZED CORNEAL TOPOGRAPHY: CPT | Performed by: OPHTHALMOLOGY

## 2023-12-20 ASSESSMENT — REFRACTION_MANIFEST
OD_VA1: 20/40-2
OS_ADD: +2.50
OS_VA1: 20/25-2
OS_CYLINDER: +0.50
OU_VA: 20/30-
OD_CYLINDER: +1.00
OS_SPHERE: +2.00
OS_AXIS: 15
OS_ADD: +2.50
OD_ADD: +2.50
OD_SPHERE: +2.00
OS_AXIS: 015
OD_AXIS: 160
OS_VA1: 20/25
OD_VA1: 20/40
OS_SPHERE: +1.25
OD_ADD: +2.50
OD_SPHERE: +2.00
OD_AXIS: 160
OS_CYLINDER: +0.50
OD_CYLINDER: +1.25

## 2023-12-20 ASSESSMENT — REFRACTION_CURRENTRX
OD_SPHERE: +3.00
OD_AXIS: 167
OS_ADD: +2.00
OD_OVR_VA: 20/
OD_VPRISM_DIRECTION: PROGS
OS_VPRISM_DIRECTION: PROGS
OD_CYLINDER: +1.00
OD_ADD: +2.00
OS_SPHERE: +2.00
OS_CYLINDER: +1.00
OS_OVR_VA: 20/
OS_AXIS: 12

## 2023-12-20 ASSESSMENT — SPHEQUIV_DERIVED
OS_SPHEQUIV: 1.5
OD_SPHEQUIV: 2.5
OS_SPHEQUIV: 2
OS_SPHEQUIV: 2.25
OD_SPHEQUIV: 2.625
OD_SPHEQUIV: 2.125

## 2023-12-20 ASSESSMENT — REFRACTION_AUTOREFRACTION
OS_SPHERE: +1.75
OS_AXIS: 047
OS_CYLINDER: +0.50
OD_SPHERE: +1.50
OD_AXIS: 168
OD_CYLINDER: +1.25

## 2023-12-20 ASSESSMENT — LID EXAM ASSESSMENTS
OS_BLEPHARITIS: LLL LUL 1+
OD_BLEPHARITIS: RLL RUL 1+

## 2024-01-11 ENCOUNTER — AMBULATORY SURGERY CENTER (OUTPATIENT)
Dept: URBAN - METROPOLITAN AREA SURGERY 17 | Facility: SURGERY | Age: 64
Setting detail: OPHTHALMOLOGY
End: 2024-01-11
Payer: COMMERCIAL

## 2024-01-11 DIAGNOSIS — H25.11: ICD-10-CM

## 2024-01-11 PROCEDURE — 66984 XCAPSL CTRC RMVL W/O ECP: CPT | Mod: RT | Performed by: OPHTHALMOLOGY

## 2024-01-12 ENCOUNTER — OFFICE (OUTPATIENT)
Dept: URBAN - METROPOLITAN AREA CLINIC 29 | Facility: CLINIC | Age: 64
Setting detail: OPHTHALMOLOGY
End: 2024-01-12
Payer: COMMERCIAL

## 2024-01-12 ENCOUNTER — RX ONLY (RX ONLY)
Age: 64
End: 2024-01-12

## 2024-01-12 DIAGNOSIS — H01.005: ICD-10-CM

## 2024-01-12 DIAGNOSIS — H52.213: ICD-10-CM

## 2024-01-12 DIAGNOSIS — H25.12: ICD-10-CM

## 2024-01-12 DIAGNOSIS — S04.52XA: ICD-10-CM

## 2024-01-12 DIAGNOSIS — H40.033: ICD-10-CM

## 2024-01-12 DIAGNOSIS — H43.811: ICD-10-CM

## 2024-01-12 DIAGNOSIS — H01.002: ICD-10-CM

## 2024-01-12 DIAGNOSIS — H01.001: ICD-10-CM

## 2024-01-12 DIAGNOSIS — H01.004: ICD-10-CM

## 2024-01-12 DIAGNOSIS — Z96.1: ICD-10-CM

## 2024-01-12 DIAGNOSIS — H35.372: ICD-10-CM

## 2024-01-12 PROCEDURE — 99024 POSTOP FOLLOW-UP VISIT: CPT | Performed by: OPHTHALMOLOGY

## 2024-01-12 PROCEDURE — 92136 OPHTHALMIC BIOMETRY: CPT | Mod: LT | Performed by: OPHTHALMOLOGY

## 2024-01-12 ASSESSMENT — SPHEQUIV_DERIVED
OS_SPHEQUIV: 2.25
OD_SPHEQUIV: 2.625
OD_SPHEQUIV: 2.125
OS_SPHEQUIV: 1.5
OD_SPHEQUIV: 2.5
OS_SPHEQUIV: 2

## 2024-01-12 ASSESSMENT — REFRACTION_MANIFEST
OS_AXIS: 015
OD_SPHERE: +2.00
OS_SPHERE: +1.25
OD_AXIS: 160
OD_CYLINDER: +1.00
OS_ADD: +2.50
OS_SPHERE: +2.00
OD_VA1: 20/40-2
OD_ADD: +2.50
OD_CYLINDER: +1.25
OS_VA1: 20/25-2
OD_AXIS: 160
OS_CYLINDER: +0.50
OS_AXIS: 15
OS_ADD: +2.50
OD_VA1: 20/40
OD_SPHERE: +2.00
OS_VA1: 20/25
OU_VA: 20/30-
OD_ADD: +2.50
OS_CYLINDER: +0.50

## 2024-01-12 ASSESSMENT — REFRACTION_AUTOREFRACTION
OD_SPHERE: +1.50
OS_CYLINDER: +0.50
OD_AXIS: 168
OD_CYLINDER: +1.25
OS_AXIS: 047
OS_SPHERE: +1.75

## 2024-01-12 ASSESSMENT — LID EXAM ASSESSMENTS
OD_BLEPHARITIS: RLL RUL 1+
OS_BLEPHARITIS: LLL LUL 1+

## 2024-01-12 ASSESSMENT — REFRACTION_CURRENTRX
OS_OVR_VA: 20/
OS_SPHERE: +2.00
OS_ADD: +2.00
OD_OVR_VA: 20/
OD_ADD: +2.00
OD_VPRISM_DIRECTION: PROGS
OD_CYLINDER: +1.00
OS_VPRISM_DIRECTION: PROGS
OS_CYLINDER: +1.00
OD_AXIS: 167
OS_AXIS: 12
OD_SPHERE: +3.00

## 2024-01-12 ASSESSMENT — CONFRONTATIONAL VISUAL FIELD TEST (CVF)
OS_FINDINGS: FULL
OD_FINDINGS: FULL

## 2024-01-17 ENCOUNTER — OFFICE (OUTPATIENT)
Dept: URBAN - METROPOLITAN AREA CLINIC 29 | Facility: CLINIC | Age: 64
Setting detail: OPHTHALMOLOGY
End: 2024-01-17
Payer: COMMERCIAL

## 2024-01-17 DIAGNOSIS — H10.32: ICD-10-CM

## 2024-01-17 DIAGNOSIS — Z96.1: ICD-10-CM

## 2024-01-17 PROBLEM — H16.221 DRY EYE SYNDROME K SICCA; RIGHT EYE: Status: ACTIVE | Noted: 2024-01-17

## 2024-01-17 PROCEDURE — 99213 OFFICE O/P EST LOW 20 MIN: CPT | Mod: 24 | Performed by: OPHTHALMOLOGY

## 2024-01-17 PROCEDURE — 99024 POSTOP FOLLOW-UP VISIT: CPT | Performed by: OPHTHALMOLOGY

## 2024-01-17 ASSESSMENT — REFRACTION_MANIFEST
OS_VA1: 20/25
OS_VA1: 20/25-2
OD_SPHERE: +2.00
OS_CYLINDER: +0.50
OS_AXIS: 15
OD_AXIS: 160
OS_ADD: +2.50
OD_VA1: 20/40
OD_SPHERE: +2.00
OD_ADD: +2.50
OD_CYLINDER: +1.00
OS_CYLINDER: +0.50
OS_AXIS: 015
OD_AXIS: 160
OS_SPHERE: +1.25
OD_CYLINDER: +1.25
OD_VA1: 20/40-2
OU_VA: 20/30-
OD_ADD: +2.50
OS_SPHERE: +2.00
OS_ADD: +2.50

## 2024-01-17 ASSESSMENT — SPHEQUIV_DERIVED
OD_SPHEQUIV: 2.625
OS_SPHEQUIV: 1.5
OD_SPHEQUIV: 2.125
OS_SPHEQUIV: 2.25
OS_SPHEQUIV: 2
OD_SPHEQUIV: 2.5

## 2024-01-17 ASSESSMENT — REFRACTION_CURRENTRX
OS_ADD: +2.00
OS_AXIS: 12
OS_OVR_VA: 20/
OD_OVR_VA: 20/
OS_CYLINDER: +1.00
OD_ADD: +2.00
OS_SPHERE: +2.00
OD_CYLINDER: +1.00
OD_VPRISM_DIRECTION: PROGS
OD_AXIS: 167
OS_VPRISM_DIRECTION: PROGS
OD_SPHERE: +3.00

## 2024-01-17 ASSESSMENT — REFRACTION_AUTOREFRACTION
OS_AXIS: 047
OS_CYLINDER: +0.50
OD_CYLINDER: +1.25
OS_SPHERE: +1.75
OD_AXIS: 168
OD_SPHERE: +1.50

## 2024-01-17 ASSESSMENT — CONFRONTATIONAL VISUAL FIELD TEST (CVF)
OS_FINDINGS: FULL
OD_FINDINGS: FULL

## 2024-01-17 ASSESSMENT — LID EXAM ASSESSMENTS
OS_BLEPHARITIS: LLL LUL 1+
OD_BLEPHARITIS: RLL RUL 1+

## 2024-01-17 ASSESSMENT — TEAR BREAK UP TIME (TBUT): OD_TBUT: T

## 2024-02-08 ENCOUNTER — AMBULATORY SURGERY CENTER (OUTPATIENT)
Dept: URBAN - METROPOLITAN AREA SURGERY 17 | Facility: SURGERY | Age: 64
Setting detail: OPHTHALMOLOGY
End: 2024-02-08
Payer: COMMERCIAL

## 2024-02-08 DIAGNOSIS — H25.12: ICD-10-CM

## 2024-02-08 PROCEDURE — 66984 XCAPSL CTRC RMVL W/O ECP: CPT | Mod: 79,LT | Performed by: OPHTHALMOLOGY

## 2024-02-09 ENCOUNTER — OFFICE (OUTPATIENT)
Dept: URBAN - METROPOLITAN AREA CLINIC 29 | Facility: CLINIC | Age: 64
Setting detail: OPHTHALMOLOGY
End: 2024-02-09
Payer: COMMERCIAL

## 2024-02-09 DIAGNOSIS — Z96.1: ICD-10-CM

## 2024-02-09 DIAGNOSIS — H16.223: ICD-10-CM

## 2024-02-09 PROCEDURE — 99024 POSTOP FOLLOW-UP VISIT: CPT | Performed by: OPHTHALMOLOGY

## 2024-02-09 ASSESSMENT — REFRACTION_AUTOREFRACTION
OD_CYLINDER: +1.25
OD_SPHERE: +1.50
OD_AXIS: 168
OS_SPHERE: +1.75
OS_AXIS: 047
OS_CYLINDER: +0.50

## 2024-02-09 ASSESSMENT — REFRACTION_MANIFEST
OD_SPHERE: +2.00
OD_CYLINDER: +1.25
OD_VA1: 20/40
OS_CYLINDER: +0.50
OD_ADD: +2.50
OD_VA1: 20/40-2
OS_ADD: +2.50
OD_AXIS: 160
OD_SPHERE: +2.00
OS_ADD: +2.50
OS_CYLINDER: +0.50
OS_VA1: 20/25
OS_SPHERE: +2.00
OS_VA1: 20/25-2
OD_ADD: +2.50
OS_AXIS: 15
OD_AXIS: 160
OD_CYLINDER: +1.00
OS_AXIS: 015
OS_SPHERE: +1.25
OU_VA: 20/30-

## 2024-02-09 ASSESSMENT — REFRACTION_CURRENTRX
OS_CYLINDER: +1.00
OD_SPHERE: +3.00
OS_OVR_VA: 20/
OD_VPRISM_DIRECTION: PROGS
OS_ADD: +2.00
OS_SPHERE: +2.00
OD_ADD: +2.00
OS_AXIS: 12
OD_OVR_VA: 20/
OD_AXIS: 167
OD_CYLINDER: +1.00
OS_VPRISM_DIRECTION: PROGS

## 2024-02-09 ASSESSMENT — SPHEQUIV_DERIVED
OS_SPHEQUIV: 2
OS_SPHEQUIV: 2.25
OS_SPHEQUIV: 1.5
OD_SPHEQUIV: 2.625
OD_SPHEQUIV: 2.5
OD_SPHEQUIV: 2.125

## 2024-02-09 ASSESSMENT — LID EXAM ASSESSMENTS
OD_BLEPHARITIS: RLL RUL 1+
OS_BLEPHARITIS: LLL LUL 1+

## 2024-02-09 ASSESSMENT — TEAR BREAK UP TIME (TBUT)
OS_TBUT: T
OD_TBUT: T

## 2024-02-09 ASSESSMENT — CONFRONTATIONAL VISUAL FIELD TEST (CVF)
OS_FINDINGS: FULL
OD_FINDINGS: FULL

## 2024-02-23 ENCOUNTER — OFFICE (OUTPATIENT)
Dept: URBAN - METROPOLITAN AREA CLINIC 29 | Facility: CLINIC | Age: 64
Setting detail: OPHTHALMOLOGY
End: 2024-02-23
Payer: COMMERCIAL

## 2024-02-23 DIAGNOSIS — Z96.1: ICD-10-CM

## 2024-02-23 PROBLEM — H16.223 DRY EYE SYNDROME K SICCA; BOTH EYES: Status: ACTIVE | Noted: 2024-02-09

## 2024-02-23 PROCEDURE — 99024 POSTOP FOLLOW-UP VISIT: CPT | Performed by: OPHTHALMOLOGY

## 2024-02-23 ASSESSMENT — REFRACTION_MANIFEST
OD_CYLINDER: +1.25
OS_SPHERE: +2.00
OD_ADD: +2.50
OS_CYLINDER: +0.50
OD_ADD: +2.50
OD_AXIS: 160
OD_SPHERE: +2.00
OS_SPHERE: +1.25
OD_CYLINDER: +1.00
OS_AXIS: 15
OS_VA1: 20/25
OS_CYLINDER: +0.50
OS_ADD: +2.50
OD_VA1: 20/40
OS_ADD: +2.50
OD_AXIS: 160
OS_VA1: 20/25-2
OD_SPHERE: +2.00
OS_AXIS: 015
OU_VA: 20/30-
OD_VA1: 20/40-2

## 2024-02-23 ASSESSMENT — REFRACTION_AUTOREFRACTION
OD_SPHERE: +0.25
OD_CYLINDER: +0.75
OD_AXIS: 10
OS_CYLINDER: +0.75
OS_AXIS: 008
OS_SPHERE: -0.50

## 2024-02-23 ASSESSMENT — REFRACTION_CURRENTRX
OD_CYLINDER: +1.00
OS_SPHERE: +2.00
OD_VPRISM_DIRECTION: PROGS
OD_SPHERE: +3.00
OD_ADD: +2.00
OS_ADD: +2.00
OD_AXIS: 167
OS_OVR_VA: 20/
OS_VPRISM_DIRECTION: PROGS
OS_AXIS: 12
OD_OVR_VA: 20/
OS_CYLINDER: +1.00

## 2024-02-23 ASSESSMENT — SPHEQUIV_DERIVED
OD_SPHEQUIV: 0.625
OS_SPHEQUIV: 2.25
OS_SPHEQUIV: 1.5
OS_SPHEQUIV: -0.125
OD_SPHEQUIV: 2.625
OD_SPHEQUIV: 2.5

## 2024-02-23 ASSESSMENT — LID EXAM ASSESSMENTS
OS_BLEPHARITIS: LLL LUL 1+
OD_BLEPHARITIS: RLL RUL 1+

## 2024-02-23 ASSESSMENT — TEAR BREAK UP TIME (TBUT)
OS_TBUT: T
OD_TBUT: T

## 2024-02-23 ASSESSMENT — CONFRONTATIONAL VISUAL FIELD TEST (CVF)
OS_FINDINGS: FULL
OD_FINDINGS: FULL

## 2024-04-01 ENCOUNTER — OFFICE (OUTPATIENT)
Dept: URBAN - METROPOLITAN AREA CLINIC 29 | Facility: CLINIC | Age: 64
Setting detail: OPHTHALMOLOGY
End: 2024-04-01
Payer: COMMERCIAL

## 2024-04-01 DIAGNOSIS — H10.533: ICD-10-CM

## 2024-04-01 PROBLEM — H01.002 BLEPHARITIS; RIGHT UPPER LID, RIGHT LOWER LID, LEFT UPPER LID, LEFT LOWER LID: Status: ACTIVE | Noted: 2024-04-01

## 2024-04-01 PROBLEM — H01.004 BLEPHARITIS; RIGHT UPPER LID, RIGHT LOWER LID, LEFT UPPER LID, LEFT LOWER LID: Status: ACTIVE | Noted: 2024-04-01

## 2024-04-01 PROBLEM — H01.005 BLEPHARITIS; RIGHT UPPER LID, RIGHT LOWER LID, LEFT UPPER LID, LEFT LOWER LID: Status: ACTIVE | Noted: 2024-04-01

## 2024-04-01 PROBLEM — H01.001 BLEPHARITIS; RIGHT UPPER LID, RIGHT LOWER LID, LEFT UPPER LID, LEFT LOWER LID: Status: ACTIVE | Noted: 2024-04-01

## 2024-04-01 PROCEDURE — 99213 OFFICE O/P EST LOW 20 MIN: CPT | Mod: 24 | Performed by: OPHTHALMOLOGY

## 2024-04-01 ASSESSMENT — LID EXAM ASSESSMENTS
OD_BLEPHARITIS: RLL RUL 2+
OS_BLEPHARITIS: LLL LUL 2+

## 2024-06-21 ENCOUNTER — OFFICE (OUTPATIENT)
Dept: URBAN - METROPOLITAN AREA CLINIC 29 | Facility: CLINIC | Age: 64
Setting detail: OPHTHALMOLOGY
End: 2024-06-21
Payer: COMMERCIAL

## 2024-06-21 DIAGNOSIS — H01.001: ICD-10-CM

## 2024-06-21 DIAGNOSIS — H01.004: ICD-10-CM

## 2024-06-21 DIAGNOSIS — H43.811: ICD-10-CM

## 2024-06-21 DIAGNOSIS — H40.033: ICD-10-CM

## 2024-06-21 DIAGNOSIS — H16.223: ICD-10-CM

## 2024-06-21 DIAGNOSIS — H01.002: ICD-10-CM

## 2024-06-21 DIAGNOSIS — S04.52XA: ICD-10-CM

## 2024-06-21 DIAGNOSIS — H10.533: ICD-10-CM

## 2024-06-21 DIAGNOSIS — Z96.1: ICD-10-CM

## 2024-06-21 DIAGNOSIS — H01.005: ICD-10-CM

## 2024-06-21 PROCEDURE — 92020 GONIOSCOPY: CPT | Performed by: OPHTHALMOLOGY

## 2024-06-21 PROCEDURE — 92134 CPTRZ OPH DX IMG PST SGM RTA: CPT | Performed by: OPHTHALMOLOGY

## 2024-06-21 PROCEDURE — 92014 COMPRE OPH EXAM EST PT 1/>: CPT | Performed by: OPHTHALMOLOGY

## 2024-06-21 ASSESSMENT — CONFRONTATIONAL VISUAL FIELD TEST (CVF)
OD_FINDINGS: FULL
OS_FINDINGS: FULL

## 2024-06-21 ASSESSMENT — LID EXAM ASSESSMENTS
OD_BLEPHARITIS: RLL RUL 2+
OS_BLEPHARITIS: LLL LUL 2+

## 2024-08-12 ENCOUNTER — OFFICE (OUTPATIENT)
Dept: URBAN - METROPOLITAN AREA CLINIC 122 | Facility: CLINIC | Age: 64
Setting detail: OPHTHALMOLOGY
End: 2024-08-12
Payer: COMMERCIAL

## 2024-08-12 DIAGNOSIS — H16.223: ICD-10-CM

## 2024-08-12 DIAGNOSIS — S04.52XA: ICD-10-CM

## 2024-08-12 DIAGNOSIS — H01.002: ICD-10-CM

## 2024-08-12 DIAGNOSIS — H40.033: ICD-10-CM

## 2024-08-12 DIAGNOSIS — H01.004: ICD-10-CM

## 2024-08-12 DIAGNOSIS — H01.001: ICD-10-CM

## 2024-08-12 DIAGNOSIS — Z96.1: ICD-10-CM

## 2024-08-12 DIAGNOSIS — H01.005: ICD-10-CM

## 2024-08-12 DIAGNOSIS — H43.811: ICD-10-CM

## 2024-08-12 DIAGNOSIS — H10.012: ICD-10-CM

## 2024-08-12 PROCEDURE — 92012 INTRM OPH EXAM EST PATIENT: CPT | Performed by: OPHTHALMOLOGY

## 2024-08-12 ASSESSMENT — CONFRONTATIONAL VISUAL FIELD TEST (CVF)
OD_FINDINGS: FULL
OS_FINDINGS: FULL

## 2024-08-12 ASSESSMENT — LID EXAM ASSESSMENTS
OD_BLEPHARITIS: RLL RUL 2+
OS_BLEPHARITIS: LLL LUL 2+

## 2024-08-20 ENCOUNTER — APPOINTMENT (OUTPATIENT)
Dept: CARDIOLOGY | Facility: CLINIC | Age: 64
End: 2024-08-20

## 2024-08-22 ENCOUNTER — NON-APPOINTMENT (OUTPATIENT)
Age: 64
End: 2024-08-22

## 2024-08-22 DIAGNOSIS — Z86.69 PERSONAL HISTORY OF OTHER DISEASES OF THE NERVOUS SYSTEM AND SENSE ORGANS: ICD-10-CM

## 2024-08-22 DIAGNOSIS — G43.909 MIGRAINE, UNSPECIFIED, NOT INTRACTABLE, W/OUT STATUS MIGRAINOSUS: ICD-10-CM

## 2024-08-22 DIAGNOSIS — R79.89 OTHER SPECIFIED ABNORMAL FINDINGS OF BLOOD CHEMISTRY: ICD-10-CM

## 2024-08-22 DIAGNOSIS — R41.89 OTHER SYMPTOMS AND SIGNS INVOLVING COGNITIVE FUNCTIONS AND AWARENESS: ICD-10-CM

## 2024-08-22 DIAGNOSIS — G95.9 DISEASE OF SPINAL CORD, UNSPECIFIED: ICD-10-CM

## 2024-08-22 NOTE — HISTORY OF PRESENT ILLNESS
[FreeTextEntry1] : Mr. Velazquez is a new patient to me today previously followed by Dr. Chen for hypertension and hyperlipidemia.

## 2024-08-23 ENCOUNTER — NON-APPOINTMENT (OUTPATIENT)
Age: 64
End: 2024-08-23

## 2024-08-23 ENCOUNTER — APPOINTMENT (OUTPATIENT)
Dept: HEART AND VASCULAR | Facility: CLINIC | Age: 64
End: 2024-08-23

## 2024-08-23 ENCOUNTER — APPOINTMENT (OUTPATIENT)
Dept: CARDIOLOGY | Facility: CLINIC | Age: 64
End: 2024-08-23
Payer: COMMERCIAL

## 2024-08-23 VITALS
BODY MASS INDEX: 29.4 KG/M2 | WEIGHT: 194 LBS | DIASTOLIC BLOOD PRESSURE: 80 MMHG | SYSTOLIC BLOOD PRESSURE: 120 MMHG | HEIGHT: 68 IN

## 2024-08-23 DIAGNOSIS — I10 ESSENTIAL (PRIMARY) HYPERTENSION: ICD-10-CM

## 2024-08-23 DIAGNOSIS — G47.33 OBSTRUCTIVE SLEEP APNEA (ADULT) (PEDIATRIC): ICD-10-CM

## 2024-08-23 DIAGNOSIS — Z87.438 PERSONAL HISTORY OF OTHER DISEASES OF MALE GENITAL ORGANS: ICD-10-CM

## 2024-08-23 DIAGNOSIS — Z86.69 PERSONAL HISTORY OF OTHER DISEASES OF THE NERVOUS SYSTEM AND SENSE ORGANS: ICD-10-CM

## 2024-08-23 DIAGNOSIS — E78.5 HYPERLIPIDEMIA, UNSPECIFIED: ICD-10-CM

## 2024-08-23 DIAGNOSIS — R07.9 CHEST PAIN, UNSPECIFIED: ICD-10-CM

## 2024-08-23 PROCEDURE — 99215 OFFICE O/P EST HI 40 MIN: CPT | Mod: 25

## 2024-08-23 PROCEDURE — 36415 COLL VENOUS BLD VENIPUNCTURE: CPT

## 2024-08-23 PROCEDURE — 93000 ELECTROCARDIOGRAM COMPLETE: CPT

## 2024-08-23 PROCEDURE — 99417 PROLNG OP E/M EACH 15 MIN: CPT

## 2024-08-23 PROCEDURE — G2212 PROLONG OUTPT/OFFICE VIS: CPT

## 2024-08-23 RX ORDER — PRIMIDONE 50 MG/1
50 TABLET ORAL TWICE DAILY
Refills: 0 | Status: ACTIVE | COMMUNITY

## 2024-08-23 RX ORDER — TAMSULOSIN HYDROCHLORIDE 0.4 MG/1
0.4 CAPSULE ORAL
Refills: 0 | Status: ACTIVE | COMMUNITY

## 2024-08-23 NOTE — CARDIOLOGY SUMMARY
[de-identified] : August 23, 2024-normal sinus rhythm lad [de-identified] : WALT 2019 sinus, rare ectopy [de-identified] : August 11, 2020 22-8.4 METS 6 minutes, 95% maximal [de-identified] : July 2019 ejection fraction 60 to 65%, left atrial enlargement

## 2024-08-23 NOTE — HISTORY OF PRESENT ILLNESS
[FreeTextEntry1] : Mr. Velazquez is a new patient to me today previously followed by Dr. Chen for hypertension and hyperlipidemia. All records back to 2019 reviewed with patient   There have been no hospitalizations since last visit. He denies chest pain, dyspnea, rare palpitations, no syncope.  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

## 2024-08-26 ENCOUNTER — OFFICE (OUTPATIENT)
Dept: URBAN - METROPOLITAN AREA CLINIC 29 | Facility: CLINIC | Age: 64
Setting detail: OPHTHALMOLOGY
End: 2024-08-26
Payer: COMMERCIAL

## 2024-08-26 ENCOUNTER — RX ONLY (RX ONLY)
Age: 64
End: 2024-08-26

## 2024-08-26 DIAGNOSIS — H40.033: ICD-10-CM

## 2024-08-26 DIAGNOSIS — H01.001: ICD-10-CM

## 2024-08-26 DIAGNOSIS — H01.002: ICD-10-CM

## 2024-08-26 DIAGNOSIS — H10.012: ICD-10-CM

## 2024-08-26 PROCEDURE — 92014 COMPRE OPH EXAM EST PT 1/>: CPT | Performed by: OPHTHALMOLOGY

## 2024-08-26 ASSESSMENT — CONFRONTATIONAL VISUAL FIELD TEST (CVF)
OS_FINDINGS: FULL
OD_FINDINGS: FULL

## 2024-08-26 ASSESSMENT — LID EXAM ASSESSMENTS
OS_BLEPHARITIS: LLL LUL 2+
OD_BLEPHARITIS: RLL RUL 2+

## 2024-09-19 ENCOUNTER — APPOINTMENT (OUTPATIENT)
Dept: PULMONOLOGY | Facility: CLINIC | Age: 64
End: 2024-09-19
Payer: COMMERCIAL

## 2024-09-19 VITALS
SYSTOLIC BLOOD PRESSURE: 120 MMHG | WEIGHT: 191 LBS | HEART RATE: 78 BPM | HEIGHT: 67 IN | BODY MASS INDEX: 29.98 KG/M2 | OXYGEN SATURATION: 98 % | DIASTOLIC BLOOD PRESSURE: 80 MMHG

## 2024-09-19 DIAGNOSIS — G47.33 OBSTRUCTIVE SLEEP APNEA (ADULT) (PEDIATRIC): ICD-10-CM

## 2024-09-19 PROCEDURE — 99203 OFFICE O/P NEW LOW 30 MIN: CPT

## 2024-09-19 NOTE — HISTORY OF PRESENT ILLNESS
[TextBox_4] : 64-YEAR-OLD MALE FOR EVALUATION OF POSSIBLE SLEEP APNEA.  pATIENT WAS DIAGNOSED WITH SLEEP APNEA IN 2019.  hE DOES NOT KNOW THE SEVERITY.  hE WAS TREATED INITIALLY WITH cpap BUT WAS NOT COMPLIANT.  hE WAS THEN GIVEN AN ORAL APPLIANCE WHICH HE DID NOT USE.  hE NOW HAS LOST 30 POUNDS SINCE 2019.  hE CURRENTLY IS 5 FEET 8 WEIGHT 191.  hIS CARDIOLOGIST HAS RECOMMENDED THAT HE BE REEVALUATED FOR POSSIBLE SLEEP APNEA.  hE IS UNCLEAR IF HE SNORES WITH NO DOCUMENTED WITNESSED APNEAS.  hE SLEEPS FROM 10 pm TO 5 am WITH NOCTURIA X 1.  hIS ePWORTH SLEEPINESS SCORE IS 3.  hE FEELS RESTED IN THE MORNING AND DENIES EXCESS DAYTIME SLEEPINESS.  pAST MEDICAL HISTORY SIGNIFICANT FOR HYPERTENSION, HYPERLIPIDEMIA AND ANXIETY.  mEDICATIONS WERE REVIEWED.  hE IS A NON-SMOKER NONDRINKER.

## 2024-09-19 NOTE — ASSESSMENT
[FreeTextEntry1] : Patient with a history of sleep apnea.  He is now 30 pounds lighter than when he was diagnosed in 2019.  I feel patient should have a home sleep study to see if he still has significant degree of sleep apnea.  Will arrange for sleep study at home.

## 2024-10-03 ENCOUNTER — APPOINTMENT (OUTPATIENT)
Dept: CARDIOLOGY | Facility: CLINIC | Age: 64
End: 2024-10-03

## 2024-10-17 ENCOUNTER — RESULT REVIEW (OUTPATIENT)
Age: 64
End: 2024-10-17

## 2024-10-23 RX ORDER — ROSUVASTATIN CALCIUM 20 MG/1
20 TABLET, FILM COATED ORAL
Qty: 90 | Refills: 3 | Status: ACTIVE | COMMUNITY
Start: 2024-10-23 | End: 1900-01-01

## 2024-11-15 ENCOUNTER — APPOINTMENT (OUTPATIENT)
Dept: CARDIOLOGY | Facility: CLINIC | Age: 64
End: 2024-11-15
Payer: COMMERCIAL

## 2024-11-15 PROCEDURE — 93306 TTE W/DOPPLER COMPLETE: CPT

## 2024-11-20 ENCOUNTER — APPOINTMENT (OUTPATIENT)
Dept: CARDIOLOGY | Facility: CLINIC | Age: 64
End: 2024-11-20
Payer: COMMERCIAL

## 2024-11-20 ENCOUNTER — TRANSCRIPTION ENCOUNTER (OUTPATIENT)
Age: 64
End: 2024-11-20

## 2024-11-20 ENCOUNTER — LABORATORY RESULT (OUTPATIENT)
Age: 64
End: 2024-11-20

## 2024-11-20 DIAGNOSIS — R00.2 PALPITATIONS: ICD-10-CM

## 2024-11-20 PROCEDURE — 36415 COLL VENOUS BLD VENIPUNCTURE: CPT

## 2024-11-20 RX ORDER — ROSUVASTATIN CALCIUM 10 MG/1
10 TABLET, FILM COATED ORAL
Qty: 90 | Refills: 3 | Status: ACTIVE | COMMUNITY
Start: 2024-11-20

## 2024-11-20 RX ORDER — EZETIMIBE 10 MG/1
10 TABLET ORAL
Qty: 90 | Refills: 3 | Status: ACTIVE | COMMUNITY
Start: 2024-11-20 | End: 1900-01-01

## 2024-11-21 ENCOUNTER — APPOINTMENT (OUTPATIENT)
Dept: CARDIOLOGY | Facility: CLINIC | Age: 64
End: 2024-11-21
Payer: COMMERCIAL

## 2024-11-21 PROCEDURE — 93246 EXT ECG>7D<15D RECORDING: CPT

## 2024-12-13 PROCEDURE — 93248 EXT ECG>7D<15D REV&INTERPJ: CPT

## 2024-12-18 ENCOUNTER — APPOINTMENT (OUTPATIENT)
Dept: CARDIOLOGY | Facility: CLINIC | Age: 64
End: 2024-12-18
Payer: COMMERCIAL

## 2024-12-18 PROCEDURE — 36415 COLL VENOUS BLD VENIPUNCTURE: CPT

## 2024-12-26 ENCOUNTER — APPOINTMENT (OUTPATIENT)
Dept: PULMONOLOGY | Facility: CLINIC | Age: 64
End: 2024-12-26
Payer: COMMERCIAL

## 2024-12-26 VITALS
HEIGHT: 67 IN | BODY MASS INDEX: 28.88 KG/M2 | OXYGEN SATURATION: 98 % | HEART RATE: 79 BPM | DIASTOLIC BLOOD PRESSURE: 70 MMHG | SYSTOLIC BLOOD PRESSURE: 110 MMHG | WEIGHT: 184 LBS

## 2024-12-26 DIAGNOSIS — G47.33 OBSTRUCTIVE SLEEP APNEA (ADULT) (PEDIATRIC): ICD-10-CM

## 2024-12-26 DIAGNOSIS — R91.8 OTHER NONSPECIFIC ABNORMAL FINDING OF LUNG FIELD: ICD-10-CM

## 2024-12-26 PROCEDURE — 99213 OFFICE O/P EST LOW 20 MIN: CPT

## 2025-07-03 ENCOUNTER — TRANSCRIPTION ENCOUNTER (OUTPATIENT)
Age: 65
End: 2025-07-03

## 2025-07-08 ENCOUNTER — OFFICE (OUTPATIENT)
Dept: URBAN - METROPOLITAN AREA CLINIC 29 | Facility: CLINIC | Age: 65
Setting detail: OPHTHALMOLOGY
End: 2025-07-08
Payer: COMMERCIAL

## 2025-07-08 DIAGNOSIS — H43.811: ICD-10-CM

## 2025-07-08 DIAGNOSIS — H01.002: ICD-10-CM

## 2025-07-08 DIAGNOSIS — H01.001: ICD-10-CM

## 2025-07-08 DIAGNOSIS — Z96.1: ICD-10-CM

## 2025-07-08 DIAGNOSIS — S04.52XA: ICD-10-CM

## 2025-07-08 DIAGNOSIS — H01.005: ICD-10-CM

## 2025-07-08 DIAGNOSIS — H01.004: ICD-10-CM

## 2025-07-08 DIAGNOSIS — H16.223: ICD-10-CM

## 2025-07-08 DIAGNOSIS — H40.033: ICD-10-CM

## 2025-07-08 PROCEDURE — 92020 GONIOSCOPY: CPT | Performed by: OPHTHALMOLOGY

## 2025-07-08 PROCEDURE — 92014 COMPRE OPH EXAM EST PT 1/>: CPT | Performed by: OPHTHALMOLOGY

## 2025-07-08 ASSESSMENT — CONFRONTATIONAL VISUAL FIELD TEST (CVF)
OS_FINDINGS: FULL
OD_FINDINGS: FULL

## 2025-07-08 ASSESSMENT — REFRACTION_CURRENTRX
OD_VPRISM_DIRECTION: SV
OS_SPHERE: +2.00
OD_ADD: +2.00
OS_CYLINDER: +1.00
OS_OVR_VA: 20/
OD_SPHERE: +3.00
OD_VPRISM_DIRECTION: PROGS
OS_AXIS: 12
OD_OVR_VA: 20/
OS_OVR_VA: 20/
OD_OVR_VA: 20/
OS_VPRISM_DIRECTION: PROGS
OS_ADD: +2.00
OS_VPRISM_DIRECTION: SV
OS_SPHERE: +2.50
OD_SPHERE: +2.50
OD_CYLINDER: +1.00
OD_AXIS: 167

## 2025-07-08 ASSESSMENT — REFRACTION_AUTOREFRACTION
OS_CYLINDER: +0.50
OS_AXIS: 012
OD_AXIS: 005
OS_SPHERE: -0.25
OD_SPHERE: PLANO
OD_CYLINDER: +1.25

## 2025-07-08 ASSESSMENT — REFRACTION_MANIFEST
OD_AXIS: 160
OU_VA: 20/30-
OD_ADD: +2.50
OS_AXIS: 15
OS_CYLINDER: +0.50
OD_VA1: 20/40
OD_CYLINDER: +1.25
OS_SPHERE: +1.25
OD_SPHERE: +2.00
OS_CYLINDER: +0.50
OD_VA1: 20/40-2
OS_VA1: 20/25-2
OD_CYLINDER: +1.00
OD_AXIS: 160
OS_VA1: 20/25
OS_AXIS: 015
OS_ADD: +2.50
OD_ADD: +2.50
OS_SPHERE: +2.00
OS_ADD: +2.50
OD_SPHERE: +2.00

## 2025-07-08 ASSESSMENT — KERATOMETRY
OD_K2POWER_DIOPTERS: 43.75
OD_AXISANGLE_DEGREES: 007
OD_K1POWER_DIOPTERS: 43.00
OS_K2POWER_DIOPTERS: 43.50
OS_K1POWER_DIOPTERS: 43.25
OS_AXISANGLE_DEGREES: 045

## 2025-07-08 ASSESSMENT — TEAR BREAK UP TIME (TBUT)
OD_TBUT: T
OS_TBUT: T

## 2025-07-08 ASSESSMENT — LID EXAM ASSESSMENTS
OD_BLEPHARITIS: RLL RUL 2+
OS_BLEPHARITIS: LLL LUL 2+

## 2025-07-08 ASSESSMENT — VISUAL ACUITY
OD_BCVA: 20/20-1
OS_BCVA: 20/20-2

## 2025-07-08 ASSESSMENT — TONOMETRY
OS_IOP_MMHG: 14
OD_IOP_MMHG: 14

## 2025-07-15 ENCOUNTER — TRANSCRIPTION ENCOUNTER (OUTPATIENT)
Age: 65
End: 2025-07-15

## 2025-09-11 ENCOUNTER — RX RENEWAL (OUTPATIENT)
Age: 65
End: 2025-09-11